# Patient Record
Sex: FEMALE | Race: WHITE | Employment: FULL TIME | URBAN - NONMETROPOLITAN AREA
[De-identification: names, ages, dates, MRNs, and addresses within clinical notes are randomized per-mention and may not be internally consistent; named-entity substitution may affect disease eponyms.]

---

## 2018-07-05 ENCOUNTER — HOSPITAL ENCOUNTER (OUTPATIENT)
Age: 33
Setting detail: SPECIMEN
Discharge: HOME | End: 2018-07-05
Payer: COMMERCIAL

## 2018-07-05 ENCOUNTER — OFFICE VISIT (OUTPATIENT)
Dept: PRIMARY CARE CLINIC | Age: 33
End: 2018-07-05
Payer: COMMERCIAL

## 2018-07-05 VITALS
HEIGHT: 61 IN | TEMPERATURE: 98.8 F | RESPIRATION RATE: 16 BRPM | DIASTOLIC BLOOD PRESSURE: 88 MMHG | HEART RATE: 98 BPM | BODY MASS INDEX: 27.79 KG/M2 | SYSTOLIC BLOOD PRESSURE: 111 MMHG | WEIGHT: 147.2 LBS

## 2018-07-05 DIAGNOSIS — J02.9 SORE THROAT: Primary | ICD-10-CM

## 2018-07-05 DIAGNOSIS — J02.9 SORE THROAT: ICD-10-CM

## 2018-07-05 LAB — S PYO AG THROAT QL: NORMAL

## 2018-07-05 PROCEDURE — 87880 STREP A ASSAY W/OPTIC: CPT | Performed by: NURSE PRACTITIONER

## 2018-07-05 PROCEDURE — 99213 OFFICE O/P EST LOW 20 MIN: CPT | Performed by: NURSE PRACTITIONER

## 2018-07-05 PROCEDURE — 87651 STREP A DNA AMP PROBE: CPT

## 2018-07-05 ASSESSMENT — ENCOUNTER SYMPTOMS
SHORTNESS OF BREATH: 0
SORE THROAT: 1
VOICE CHANGE: 0
EYE DISCHARGE: 0
EYES NEGATIVE: 1
WHEEZING: 0
TROUBLE SWALLOWING: 0
COUGH: 1
CHEST TIGHTNESS: 0
RHINORRHEA: 0

## 2018-07-05 NOTE — PROGRESS NOTES
[facility] PHYSICIANS  [facility] PRIMARY CARE [place] [address] [facility] [address]  Dept: [phone]  Dept Fax: [phone]    Rena Dykes is a 35 y.o. female who presents to the [facility] in Care today for her medical conditions/complaints as noted below. Rena Dykes is c/o of Pharyngitis (started friday)      HPI:     35year old female present to walk in for c/o sore throat since Friday. Denies fever or rash. States I always have big tonsils. No change in the size of her tonsils. Patient verbalizes she looked in the mirror to look at her tonsils and they look the same as they always have. States in the past they wanted to remove her tonsils however they decided not to remove the tonsils. Patient denies difficulty swallowing or breathing. Daughter with same symptoms. States they just went camping. Pharyngitis   This is a new problem. The current episode started in the past 7 days (5 days ). The problem occurs intermittently. The problem has been gradually improving. Associated symptoms include congestion (sometimes in the am with the weather ), coughing, headaches (\"sometimes\"  ) and a sore throat. Pertinent negatives include no chills, fever or rash. Nothing aggravates the symptoms. Treatments tried: salt water  The treatment provided moderate relief. No past medical history on file. Current Outpatient Prescriptions   Medication Sig Dispense Refill    PRENATAL VITAMINS PO Take  by mouth daily.  butalbital-acetaminophen-caffeine (FIORICET, ESGIC) per tablet Take 1 tablet by mouth every 6 hours as needed for Headaches (1 to 2 tablets PRN headache). No current facility-administered medications for this visit. No Known Allergies    Subjective:      Review of Systems   Constitutional: Negative. Negative for activity change, appetite change, chills and fever. HENT: Positive for congestion (sometimes in the am with the weather ) and sore throat. has no rales. Musculoskeletal: Normal range of motion. She exhibits no edema or tenderness. Lymphadenopathy:     She has no cervical adenopathy. Neurological: She is alert and oriented to person, place, and time. No cranial nerve deficit. She exhibits normal muscle tone. Coordination normal.   Skin: Skin is warm and dry. No rash noted. No erythema. No pallor. Psychiatric: She has a normal mood and affect. Her speech is normal and behavior is normal. Judgment and thought content normal.   Nursing note and vitals reviewed. /88 (Site: Left Arm, Position: Sitting, Cuff Size: Large Adult)   Pulse 98   Temp 98.8 °F (37.1 °C)   Resp 16   Ht 5' 1\" (1.549 m)   Wt 147 lb 3.2 oz (66.8 kg)   BMI 27.81 kg/m²     Assessment:      Diagnosis Orders   1. Sore throat  POCT rapid strep A    Strep A DNA probe, amplification     POCT strep negative   Plan:     1. Pharyngitis: Discussed negative strep results with patient. Informed if the strep DNA results returned positive for initiating antibiotic. Informed she can take dose appropriate Tylenol or Motrin, Chloraseptic spray, throat lozenges for sore throat. Informed if she has any difficulty swallowing or breathing sheets, and 911 and go to the emergency room. Recommended patient fill out a new patient packet to establish care with any provider. Advised to  a new patient packet prior to leaving office today. Discussed exam, POCT findings, plan of care (including prescriptive and supportive as listed below) and follow-up at length with patient and or Patient. Reviewed all prescribed and recommended medications, administration and side effects. Encouraged to return to [address] [place] [address] for no improvement and or worsening of symptoms. To ER or call 911 if any difficulty breathing, shortness of breath, inability to swallow, hives or temp greater than 103 degrees. Questions answered. They verbalized understanding and agreement. Return in about 2 days (around 7/7/2018) for with PCP. No orders of the defined types were placed in this encounter. All patient questions answered. Pt voiced understanding.       Electronically signed by NATALIIA Millard CNP on 7/5/2018 at 2:56 PM

## 2018-07-06 LAB
DIRECT EXAM: NORMAL
Lab: NORMAL
SPECIMEN DESCRIPTION: NORMAL
STATUS: NORMAL

## 2018-07-09 ENCOUNTER — TELEPHONE (OUTPATIENT)
Dept: PRIMARY CARE CLINIC | Age: 33
End: 2018-07-09

## 2018-07-09 NOTE — TELEPHONE ENCOUNTER
Called patient and left message that throat culture was negative for strep. To call office with any questions.

## 2018-08-24 ENCOUNTER — OFFICE VISIT (OUTPATIENT)
Dept: PRIMARY CARE CLINIC | Age: 33
End: 2018-08-24
Payer: COMMERCIAL

## 2018-08-24 VITALS
RESPIRATION RATE: 16 BRPM | BODY MASS INDEX: 28.53 KG/M2 | SYSTOLIC BLOOD PRESSURE: 114 MMHG | TEMPERATURE: 99 F | HEIGHT: 61 IN | DIASTOLIC BLOOD PRESSURE: 78 MMHG | WEIGHT: 151.1 LBS | HEART RATE: 83 BPM

## 2018-08-24 DIAGNOSIS — L74.3 MILIARIA: Primary | ICD-10-CM

## 2018-08-24 PROCEDURE — 1036F TOBACCO NON-USER: CPT | Performed by: NURSE PRACTITIONER

## 2018-08-24 PROCEDURE — G8419 CALC BMI OUT NRM PARAM NOF/U: HCPCS | Performed by: NURSE PRACTITIONER

## 2018-08-24 PROCEDURE — 99213 OFFICE O/P EST LOW 20 MIN: CPT | Performed by: NURSE PRACTITIONER

## 2018-08-24 PROCEDURE — G8427 DOCREV CUR MEDS BY ELIG CLIN: HCPCS | Performed by: NURSE PRACTITIONER

## 2018-08-24 RX ORDER — TRIAMCINOLONE ACETONIDE 1 MG/G
CREAM TOPICAL
Qty: 1 TUBE | Refills: 1 | Status: SHIPPED | OUTPATIENT
Start: 2018-08-24 | End: 2018-11-14

## 2018-08-24 ASSESSMENT — ENCOUNTER SYMPTOMS
SHORTNESS OF BREATH: 0
SORE THROAT: 0
COUGH: 0
RHINORRHEA: 0
DIARRHEA: 0
VOMITING: 0

## 2018-08-24 NOTE — PROGRESS NOTES
HENT:   Mouth/Throat: Oropharynx is clear and moist.   Eyes: Conjunctivae are normal.   Cardiovascular: Normal rate and regular rhythm. Pulmonary/Chest: Effort normal and breath sounds normal.   Neurological: She is alert. Skin: Skin is warm and dry. Rash noted. Rash is papular. There is erythema. Nursing note and vitals reviewed. /78 (Site: Left Arm, Position: Sitting, Cuff Size: Medium Adult)   Pulse 83   Temp 99 °F (37.2 °C) (Temporal)   Resp 16   Ht 5' 1\" (1.549 m)   Wt 151 lb 1.6 oz (68.5 kg)   LMP 07/18/2018 (Approximate)   BMI 28.55 kg/m²     Assessment:      Diagnosis Orders   1. Miliaria  triamcinolone (KENALOG) 0.1 % cream       Plan:             Discussed exam, POCT findings, plan of care (including prescriptive and supportive as listed below) and follow-up at length with patient and or Patient. Reviewed all prescribed and recommended medications, administration and side effects. Encouraged to return to [address] [place] [address] for no improvement and or worsening of symptoms. To ER or call 911 if any difficulty breathing, shortness of breath, inability to swallow, hives or temp greater than 103 degrees. Questions answered. They verbalized understanding and agreement. Return if symptoms worsen or fail to improve. Orders Placed This Encounter   Medications    triamcinolone (KENALOG) 0.1 % cream     Sig: Apply topically 2 times daily. Dispense:  1 Tube     Refill:  1          All patient questions answered. Pt voiced understanding.       Electronically signed by AnuragNATALIIA lantigua Sa, CNP on 8/24/2018 at 5:23 PM

## 2018-12-07 ENCOUNTER — OFFICE VISIT (OUTPATIENT)
Dept: OBGYN | Age: 33
End: 2018-12-07
Payer: COMMERCIAL

## 2018-12-07 ENCOUNTER — HOSPITAL ENCOUNTER (OUTPATIENT)
Age: 33
Setting detail: SPECIMEN
Discharge: HOME | End: 2018-12-07
Payer: COMMERCIAL

## 2018-12-07 VITALS
DIASTOLIC BLOOD PRESSURE: 68 MMHG | BODY MASS INDEX: 27.56 KG/M2 | HEIGHT: 61 IN | WEIGHT: 146 LBS | SYSTOLIC BLOOD PRESSURE: 116 MMHG

## 2018-12-07 DIAGNOSIS — Z01.419 ENCOUNTER FOR ANNUAL ROUTINE GYNECOLOGICAL EXAMINATION: ICD-10-CM

## 2018-12-07 DIAGNOSIS — R68.82 DECREASED LIBIDO: Primary | ICD-10-CM

## 2018-12-07 LAB
TESTOSTERONE TOTAL: NORMAL
TSH SERPL DL<=0.05 MIU/L-ACNC: NORMAL UIU/ML

## 2018-12-07 PROCEDURE — G0145 SCR C/V CYTO,THINLAYER,RESCR: HCPCS

## 2018-12-07 PROCEDURE — 87624 HPV HI-RISK TYP POOLED RSLT: CPT

## 2018-12-07 PROCEDURE — G8482 FLU IMMUNIZE ORDER/ADMIN: HCPCS | Performed by: ADVANCED PRACTICE MIDWIFE

## 2018-12-07 PROCEDURE — 99395 PREV VISIT EST AGE 18-39: CPT | Performed by: ADVANCED PRACTICE MIDWIFE

## 2018-12-11 LAB
HPV SAMPLE: NORMAL
HPV SOURCE: NORMAL
HPV, GENOTYPE 16: NOT DETECTED
HPV, GENOTYPE 18: NOT DETECTED
HPV, HIGH RISK OTHER: NOT DETECTED
HPV, INTERPRETATION: NORMAL

## 2018-12-15 ASSESSMENT — ENCOUNTER SYMPTOMS
CONSTIPATION: 0
DIARRHEA: 0
ABDOMINAL PAIN: 0
SORE THROAT: 0
NAUSEA: 0
BACK PAIN: 0
SHORTNESS OF BREATH: 0

## 2018-12-26 LAB — CYTOLOGY REPORT: NORMAL

## 2019-01-22 ENCOUNTER — OFFICE VISIT (OUTPATIENT)
Dept: OBGYN | Age: 34
End: 2019-01-22

## 2019-01-22 VITALS
HEIGHT: 61 IN | SYSTOLIC BLOOD PRESSURE: 122 MMHG | BODY MASS INDEX: 28.28 KG/M2 | WEIGHT: 149.8 LBS | DIASTOLIC BLOOD PRESSURE: 64 MMHG

## 2019-01-22 DIAGNOSIS — R53.83 OTHER FATIGUE: ICD-10-CM

## 2019-01-22 DIAGNOSIS — R68.82 DECREASED LIBIDO: ICD-10-CM

## 2019-01-22 DIAGNOSIS — R87.615 UNSATISFACTORY CERVICAL PAPANICOLAOU SMEAR: Primary | ICD-10-CM

## 2019-01-22 LAB — PAP SMEAR: NORMAL

## 2019-03-06 DIAGNOSIS — R87.615 UNSATISFACTORY CERVICAL PAPANICOLAOU SMEAR: ICD-10-CM
